# Patient Record
Sex: FEMALE | Race: WHITE | NOT HISPANIC OR LATINO | Employment: FULL TIME | URBAN - METROPOLITAN AREA
[De-identification: names, ages, dates, MRNs, and addresses within clinical notes are randomized per-mention and may not be internally consistent; named-entity substitution may affect disease eponyms.]

---

## 2017-09-28 ENCOUNTER — OFFICE VISIT (OUTPATIENT)
Dept: OBGYN | Facility: CLINIC | Age: 47
End: 2017-09-28
Payer: COMMERCIAL

## 2017-09-28 VITALS
WEIGHT: 118 LBS | BODY MASS INDEX: 18.52 KG/M2 | HEART RATE: 92 BPM | SYSTOLIC BLOOD PRESSURE: 92 MMHG | HEIGHT: 67 IN | DIASTOLIC BLOOD PRESSURE: 64 MMHG

## 2017-09-28 DIAGNOSIS — N89.8 VAGINAL IRRITATION: Primary | ICD-10-CM

## 2017-09-28 LAB
SPECIMEN SOURCE: NORMAL
WET PREP SPEC: NORMAL

## 2017-09-28 PROCEDURE — 99213 OFFICE O/P EST LOW 20 MIN: CPT | Performed by: OBSTETRICS & GYNECOLOGY

## 2017-09-28 PROCEDURE — 87210 SMEAR WET MOUNT SALINE/INK: CPT | Performed by: OBSTETRICS & GYNECOLOGY

## 2017-09-28 NOTE — PROGRESS NOTES
"OBGYN Note    SUBJECTIVE:  Rosie Lam is an 47 year old woman who presents with \"vaginal irritation.\" Symptoms   include local irritation \"anteriorly and posteriorly.\" Onset of symptoms 6   days ago, gradually improving since. States she had sex for an extended amount of time on Friday. She has had irritation since that time. She noticed her clitoral child to be red and enlarged over the weekend. Her and her partner used a new kind of lubrication. She does know the name or type. No malodor or change in vaginal discharge.     Predisposing factors: none  Hx of previous vaginitis: none  Sexually active: yes, single partner    Periods: two over the last year  Menopausal symptoms: has intermittent hot flashes    Past Medical History:   Diagnosis Date     History of colposcopy with cervical biopsy 8/23/10    Anaya 1     Papanicolaou smear of cervix with low grade squamous intraepithelial lesion (LGSIL) 3/29/10       Past Surgical History:   Procedure Laterality Date     US BREAST CYST ASPIRATION INITIAL LEFT  1-13       Family History   Problem Relation Age of Onset     Family History Negative Father      father unknown - no relationship     Family History Negative Mother      HEART DISEASE Maternal Grandmother      HEART DISEASE Maternal Aunt      HEART DISEASE Maternal Uncle        Social History   Substance Use Topics     Smoking status: Current Every Day Smoker     Packs/day: 1.00     Years: 15.00     Types: Cigarettes     Smokeless tobacco: Never Used     Alcohol use 0.0 oz/week     0 Standard drinks or equivalent per week      Comment: 1x/mo       Current Outpatient Prescriptions   Medication     Ibuprofen (ADVIL) 200 MG capsule     No current facility-administered medications for this visit.      Allergies   Allergen Reactions     No Known Drug Allergies      ROS:   Vagina: no abnormal discharge or odor  Vulva: no pruritis  Skin: no lesions or erythema except as noted in vulvar region    OBJECTIVE:  BP 92/64  " "Pulse 92  Ht 5' 6.5\" (1.689 m)  Wt 118 lb (53.5 kg)  LMP 09/14/2017 (Approximate)  Breastfeeding? No  BMI 18.76 kg/m2   General: alert, awake, normal body habitus, appears well  Pelvic: External genitalia and vagina normal. Bimanual and rectovaginal normal., negative findings:  external genitalia normal, no abnormal discharge, vaginal mucosa normal, cervix- no lesions, Bartholin's glands negative. Very small 3mm cyst noted in left labia.    Small hemorrhoidal tissue noted.    ASSESSMENT:  Vaginitis - likely from new lubrication product     PLAN:  1)  Discontinue use of lubrication product; recommend water based lubrication without additives/aromas or use of olive oil  2)  Recheck if symptoms persist, worsen, or new symptoms develop.  3) Wet prep collected and pending      Jillian Monae, DO    "

## 2017-09-28 NOTE — NURSING NOTE
"Chief Complaint   Patient presents with     Vaginal Problem     Vaginal irritation       Initial BP 92/64  Pulse 92  Ht 5' 6.5\" (1.689 m)  Wt 118 lb (53.5 kg)  LMP 2017 (Approximate)  Breastfeeding? No  BMI 18.76 kg/m2 Estimated body mass index is 18.76 kg/(m^2) as calculated from the following:    Height as of this encounter: 5' 6.5\" (1.689 m).    Weight as of this encounter: 118 lb (53.5 kg).  BP completed using cuff size: regular        The following HM Due: NONE      The following patient reported/Care Every where data was sent to:  P ABSTRACT QUALITY INITIATIVES [52936]  NA     patient has appointment for today    Clover BONILLA               "

## 2017-09-28 NOTE — MR AVS SNAPSHOT
"              After Visit Summary   2017    Rosie Lam    MRN: [id number]           Patient Information     Date Of Birth          1970        Visit Information        Provider Department      2017 8:30 AM Jillian Monae DO [facility]        Today's Diagnoses     Vaginal irritation    -  1       Follow-ups after your visit        Follow-up notes from your care team     Return if symptoms worsen or fail to improve.      Who to contact     If you have questions or need follow up information about today's clinic visit or your schedule please contact [facility] directly at [phone].  Normal or non-critical lab and imaging results will be communicated to you by [employer], letter or phone within 4 business days after the clinic has received the results. If you do not hear from us within 7 days, please contact the clinic through [employer] or phone. If you have a critical or abnormal lab result, we will notify you by phone as soon as possible.  Submit refill requests through [employer] or call your pharmacy and they will forward the refill request to us. Please allow 3 business days for your refill to be completed.          Additional Information About Your Visit        MyChart Information     [employer] lets you send messages to your doctor, view your test results, renew your prescriptions, schedule appointments and more. To sign up, go to [email or web address] . Click on \"Log in\" on the left side of the screen, which will take you to the Welcome page. Then click on \"Sign up Now\" on the right side of the page.     You will be asked to enter the access code listed below, as well as some personal information. Please follow the directions to create your username and password.     Your access code is: [email or web address]  Expires: 2017  9:39 AM     Your access code will  in 90 days. If you need help or a new code, please call your [facility] or " "[phone].        Care EveryWhere ID     This is your Care EveryWhere ID. This could be used by other organizations to access your [facility] medical records  [id number]        Your Vitals Were     Pulse Height Last Period Breastfeeding? BMI (Body Mass Index)       92 5' 6.5\" (1.689 m) 09/14/2017 (Approximate) No 18.76 kg/m2        Blood Pressure from Last 3 Encounters:   09/28/17 92/64   05/17/16 119/80   05/16/16 120/60    Weight from Last 3 Encounters:   09/28/17 118 lb (53.5 kg)   05/16/16 107 lb 11.2 oz (48.9 kg)   05/09/16 105 lb 9.6 oz (47.9 kg)              We Performed the Following     Wet prep          Today's Medication Changes          These changes are accurate as of: 9/28/17  9:39 AM.  If you have any questions, ask your nurse or doctor.               Stop taking these medicines if you haven't already. Please contact your care team if you have questions.     escitalopram 10 MG tablet   Commonly known as:  LEXAPRO   Stopped by:  Jillian Monae DO           MULTIVITAMIN & MINERAL PO   Stopped by:  Jillian Monae DO                    Primary Care Provider Office Phone # Fax #    Hammad Farrar [phone]       XXX RETIRED  [address] [facility] [address]        Equal Access to Services     RODNEY VICKERS AH: Hadii lissa ku hadasho Solanreali, waaxda luqadaha, qaybta kaalmada adeegyada, mabel ray . So [facility] [phone].    ATENCIÓN: Si habla español, tiene a calhoun disposición servicios gratuitos de asistencia lingüística. Llame al [phone].    We comply with applicable federal civil rights laws and [place] laws. We do not discriminate on the basis of race, color, national origin, age, disability sex, sexual orientation or gender identity.            Thank you!     Thank you for choosing [facility]  for your care. Our goal is always to provide you with excellent care. Hearing back from our patients is one way we " can continue to improve our services. Please take a few minutes to complete the written survey that you may receive in the mail after your visit with us. Thank you!             Your Updated Medication List - Protect others around you: Learn how to safely use, store and throw away your medicines at www.disposemymeds.org.          This list is accurate as of: 9/28/17  9:39 AM.  Always use your most recent med list.                   Brand Name Dispense Instructions for use Diagnosis    ADVIL 200 MG capsule   Generic drug:  ibuprofen      Take 200 mg by mouth every 4 hours as needed.

## 2017-10-03 ENCOUNTER — NURSE TRIAGE (OUTPATIENT)
Dept: NURSING | Facility: CLINIC | Age: 47
End: 2017-10-03

## 2017-10-03 NOTE — TELEPHONE ENCOUNTER
Rosie returning clinic call.  Noted per lab results, Rosie being contacted regarding results from 9/28/17.  Did already speak with a staff person, aware of results.  Phone number verified as correct.     Additional Information    [1] Follow-up call to recent contact AND [2] information only call, no triage required    Protocols used: [facility]

## 2018-05-22 ENCOUNTER — OFFICE VISIT (OUTPATIENT)
Dept: INTERNAL MEDICINE | Facility: CLINIC | Age: 48
End: 2018-05-22
Payer: COMMERCIAL

## 2018-05-22 VITALS
HEART RATE: 70 BPM | HEIGHT: 66 IN | DIASTOLIC BLOOD PRESSURE: 60 MMHG | WEIGHT: 121.4 LBS | TEMPERATURE: 98.4 F | SYSTOLIC BLOOD PRESSURE: 118 MMHG | RESPIRATION RATE: 20 BRPM | BODY MASS INDEX: 19.51 KG/M2

## 2018-05-22 DIAGNOSIS — Z11.4 SCREENING FOR HUMAN IMMUNODEFICIENCY VIRUS WITHOUT PRESENCE OF RISK FACTORS: ICD-10-CM

## 2018-05-22 DIAGNOSIS — Z76.89 ENCOUNTER TO ESTABLISH CARE: ICD-10-CM

## 2018-05-22 DIAGNOSIS — N95.1 MENOPAUSAL SYMPTOMS: Primary | ICD-10-CM

## 2018-05-22 DIAGNOSIS — Z13.220 SCREENING FOR CHOLESTEROL LEVEL: ICD-10-CM

## 2018-05-22 DIAGNOSIS — Z13.1 SCREENING FOR DIABETES MELLITUS: ICD-10-CM

## 2018-05-22 DIAGNOSIS — Z12.31 ENCOUNTER FOR SCREENING MAMMOGRAM FOR BREAST CANCER: ICD-10-CM

## 2018-05-22 DIAGNOSIS — Z71.6 ENCOUNTER FOR SMOKING CESSATION COUNSELING: ICD-10-CM

## 2018-05-22 DIAGNOSIS — R21 RASH: ICD-10-CM

## 2018-05-22 PROCEDURE — 99215 OFFICE O/P EST HI 40 MIN: CPT | Performed by: INTERNAL MEDICINE

## 2018-05-22 RX ORDER — VENLAFAXINE HYDROCHLORIDE 150 MG/1
150 CAPSULE, EXTENDED RELEASE ORAL DAILY
Qty: 90 CAPSULE | Refills: 0 | Status: SHIPPED | OUTPATIENT
Start: 2018-05-22 | End: 2018-08-23

## 2018-05-22 RX ORDER — CLOBETASOL PROPIONATE 0.5 MG/G
CREAM TOPICAL
Qty: 30 G | Refills: 0 | Status: SHIPPED | OUTPATIENT
Start: 2018-05-22 | End: 2020-07-02

## 2018-05-22 RX ORDER — MELATON/B.COHOSH/VALERIAN/HOPS 3 MG-40 MG
CAPSULE ORAL DAILY
COMMUNITY
Start: 2018-05-22 | End: 2021-07-30

## 2018-05-22 RX ORDER — VARENICLINE TARTRATE 1 MG/1
1 TABLET, FILM COATED ORAL 2 TIMES DAILY
Qty: 56 TABLET | Refills: 2 | Status: SHIPPED | OUTPATIENT
Start: 2018-05-22 | End: 2021-07-30

## 2018-05-22 ASSESSMENT — PAIN SCALES - GENERAL: PAINLEVEL: NO PAIN (0)

## 2018-05-22 NOTE — PROGRESS NOTES
SUBJECTIVE:                                                      HPI: Rosie Lam is a pleasant 47 year old female who presents with hot flashes, desire to quit smoking, and genital skin concern:    ---    Re: hot flashes:  - started 3 months ago  - periods have been irregular for a year or two; LMP ~2 months ago  - occur frequently throughout the day and night  - having difficulty sleeping due to hot flashes    Has been using black cohosh -no significant improvement in symptoms.    SH significant for current smoking.    ---    Re: desire to quit smoking:  - patient has tried quitting smoking before - cold turkey and with nicotine replacement -did not work  - has been smoking for 30 years, ~1 ppd  - patient would like to try Chantix  - discussed potential side effects of Chantix including vivid dreams and new or worsening mood symptoms    ---    Re: genital skin concern:  - has noticed an area of redness, itching, and irritation down there  - has been present for several months  - no new soaps or detergents    - no fevers or chills  - no abnormal vaginal discharge or vaginal symptoms  - no urinary symptoms    ---    Patient also like to establish care.    PMH, PSH, FH, SH, medications, allergies, immunizations, and preventative health measures reviewed.     Past Medical History:   Diagnosis Date     NO ACTIVE PROBLEMS      Past Surgical History:   Procedure Laterality Date     NO HISTORY OF SURGERY       Family History   Problem Relation Age of Onset     Type 2 Diabetes Mother      Hypertension Mother      Hyperlipidemia Mother      Skin Cancer Mother      non-melanoma     Myocardial Infarction Maternal Grandmother      later in life     Lung Cancer Maternal Grandmother      non-smoker     Coronary Artery Disease Maternal Aunt      x2 aunts; one with MI, one with PCIs - both in 50s     CEREBROVASCULAR DISEASE No family hx of      Breast Cancer No family hx of      Ovarian Cancer No family hx of      Colon  "Cancer No family hx of      Occupational History     [employer]      Social History Main Topics     Smoking status: Current Every Day Smoker     Packs/day: 1.00     Years: 30.00     Types: Cigarettes     Smokeless tobacco: Never Used     Alcohol use 0.0 oz/week     0 Standard drinks or equivalent per week      Comment: 1x/week     Drug use: No      Comment: remote history of sporadic cocaine uses     Sexual activity: Yes     Partners: Male     Social History Narrative    Dating/in long-term relationship.    1 adult son (in [place])    1 grandchild.    Yoga on and off, exercises on and off, walks in summer.      Allergies   Allergen Reactions     No Known Drug Allergies      Current Outpatient Prescriptions   Medication Sig     Black Cohosh-SoyIsoflav-Magnol (ESTROVEN MENOPAUSE RELIEF) CAPS Take by mouth daily     Immunization History   Administered Date(s) Administered     TDAP Vaccine (Adacel) 08/30/2010     OBJECTIVE:                                                      /60 (BP Location: Left arm, Patient Position: Chair, Cuff Size: Adult Regular)  Pulse 70  Temp 98.4  F (36.9  C) (Oral)  Resp 20  Ht 5' 6\" (1.676 m)  Wt 121 lb 6.4 oz (55.1 kg)  BMI 19.59 kg/m2  Constitutional: well-appearing  Psych: normal judgment and insight; normal mood and affect; recent and remote memory intact; oriented to time, place, and person  Integumentary: well-circumscribed mildly erythematous patch, with fine overlying scale, ~1.5 cm in size, central, anterior perineum    PREVENTATIVE HEALTH                                                      BMI: within normal limits   Blood pressure: within normal limits   Breast CA screening: DUE  Cervical CA screening: not medically indicated at this time   Colon CA screening: not medically indicated at this time   Lung CA screening: not medically indicated at this time   Dexa: not medically indicated at this time   Screening HCV: n/a   Screening HIV: DUE  Screening " cholesterol: DUE  Screening diabetes: DUE  STD testing: no risk factors present  Depression screening: PHQ-2 assessment completed and reviewed - no intervention indicated at this time  Alcohol misuse screening: alcohol use reviewed - no intervention indicated at this time  Immunizations: reviewed; up to date     ASSESSMENT/PLAN:                                                       (N95.1) Menopausal symptoms  (primary encounter diagnosis)  Comment: patient is not a candidate for OCP or HRT due to smoking status.  Plan:    - TRIAL of venlafaxine  mg daily.   - if no or insufficient improvement or adverse side effects develop, patient to contact MD.    (Z71.6,  Z72.0) Encounter for smoking cessation counseling  Plan:    - START Chantix (starter and continuing packs prescribed) 1 week prior to smoking cessation.   - if adverse side effects develop, patient to stop and contact MD.    (R21) Rash  Comment:    - central, anterior perineum, along the vulvar edge.    - suspect psoriasis, but lichen sclerosis, dermatitis, and fungal infection are also on the differential.  Plan:    - TRIAL of clobetasol 0.05% cream twice a day for no more than 2 weeks.   - if symptoms worsen, change, or do not improve, patient to contact MD.    (Z76.89) Encounter to establish care  Comment: PMH, PSH, FH, SH, medications, allergies, immunizations, and preventative health measures reviewed.   Plan: see below for plans.    (Z13.1) Screening for diabetes mellitus  (Z13.220) Screening for cholesterol level  (Z11.4) Screening for human immunodeficiency virus without presence of risk factors  Plan: patient will return for fasting labs and able.    (Z12.31) Encounter for screening mammogram for breast cancer  Plan: mammogram ordered - patient to schedule.    The instructions on the AVS were discussed and explained to the patient. Patient expressed understanding of instructions.    A total of 42 minutes were spent face-to-face with this patient  during this encounter and over half of that time was spent on counseling and coordination of care re: above diagnoses and plans of care.     (Chart documentation was completed, in part, with Rad voice-recognition software. Even though reviewed, some grammatical, spelling, and word errors may remain.)    Brandy Shah MD   [address] [address]  T: [phone], F: [phone]

## 2018-05-22 NOTE — PATIENT INSTRUCTIONS
"Please schedule mammogram and fasting labs on the way out today.     ---    For smoking cessation:    Chantix - \"starter\" pack - start first; followed by \"continuing\" pack - has refills.     Start Chantix 1 week prior to smoking cessation.    ---    For hot flashes:    Start venlafaxine 150mg daily.    Can increase as needed.     ---    For rash down there:    TRIAL of clobetasol cream twice a day for two weeks.    If no improvement in 2 weeks or rash worsens, please let me know.       "

## 2018-05-22 NOTE — MR AVS SNAPSHOT
"              After Visit Summary   5/22/2018    Rosie Lam    MRN: [id number]           Patient Information     Date Of Birth          1970        Visit Information        Provider Department      5/22/2018 10:30 AM Brandy Shah MD [facility]        Today's Diagnoses     Encounter for smoking cessation counseling    -  1    Menopausal symptoms        Screening for diabetes mellitus        Screening for cholesterol level        Screening for human immunodeficiency virus without presence of risk factors        Encounter for screening mammogram for breast cancer        Rash          Care Instructions    Please schedule mammogram and fasting labs on the way out today.     ---    For smoking cessation:    Chantix - \"starter\" pack - start first; followed by \"continuing\" pack - has refills.     Start Chantix 1 week prior to smoking cessation.    ---    For hot flashes:    Start venlafaxine 150mg daily.    Can increase as needed.     ---    For rash down there:    TRIAL of clobetasol cream twice a day for two weeks.    If no improvement in 2 weeks or rash worsens, please let me know.               Follow-ups after your visit        Future tests that were ordered for you today     Open Future Orders        Priority Expected Expires Ordered    MA Screening Digital Bilateral Routine  5/23/2019 5/22/2018    Lipid Profile Routine  5/22/2019 5/22/2018    Comprehensive metabolic panel Routine  5/22/2019 5/22/2018    HIV Antigen Antibody Combo Routine  5/22/2019 5/22/2018            Who to contact     If you have questions or need follow up information about today's clinic visit or your schedule please contact [facility] directly at [phone].  Normal or non-critical lab and imaging results will be communicated to you by MyChart, letter or phone within 4 business days after the clinic has received the results. If you do not hear from us within 7 days, please " "contact the clinic through [employer] or phone. If you have a critical or abnormal lab result, we will notify you by phone as soon as possible.  Submit refill requests through [employer] or call your pharmacy and they will forward the refill request to us. Please allow 3 business days for your refill to be completed.          Additional Information About Your Visit        [employer] Information     [employer] lets you send messages to your doctor, view your test results, renew your prescriptions, schedule appointments and more. To sign up, go to [email or web address] . Click on \"Log in\" on the left side of the screen, which will take you to the Welcome page. Then click on \"Sign up Now\" on the right side of the page.     You will be asked to enter the access code listed below, as well as some personal information. Please follow the directions to create your username and password.     Your access code is: [email or web address]  Expires: 2018 11:02 AM     Your access code will  in 90 days. If you need help or a new code, please call your [facility] clinic or [phone].        Care EveryWhere ID     This is your Care EveryWhere ID. This could be used by other organizations to access your [facility] medical records  [id number]        Your Vitals Were     Pulse Temperature Respirations Height BMI (Body Mass Index)       70 98.4  F (36.9  C) (Oral) 20 5' 6\" (1.676 m) 19.59 kg/m2        Blood Pressure from Last 3 Encounters:   18 118/60   17 92/64   16 119/80    Weight from Last 3 Encounters:   18 121 lb 6.4 oz (55.1 kg)   17 118 lb (53.5 kg)   16 107 lb 11.2 oz (48.9 kg)                 Today's Medication Changes          These changes are accurate as of 18 11:02 AM.  If you have any questions, ask your nurse or doctor.               Start taking these medicines.        Dose/Directions    clobetasol 0.05 % cream   Commonly known as:  TEMOVATE   Used for:  Rash   Started by:  Alyssa, " Brandy SINHA MD        Apply sparingly to affected area twice daily for 14 days.  Do not apply to face.   Quantity:  30 g   Refills:  0       * varenicline 0.5 MG X 11 & 1 MG X 42 tablet   Commonly known as:  CHANTIX STARTING MONTH PAK   Used for:  Encounter for smoking cessation counseling   Started by:  Brandy Shah MD        Take 0.5 mg tab daily for 3 days, then 0.5 mg tab twice daily for 4 days, then 1 mg twice daily.   Quantity:  53 tablet   Refills:  0       * varenicline 1 MG tablet   Commonly known as:  CHANTIX   Used for:  Encounter for smoking cessation counseling   Started by:  Brandy Shah MD        Dose:  1 mg   Take 1 tablet (1 mg) by mouth 2 times daily   Quantity:  56 tablet   Refills:  2       venlafaxine 150 MG 24 hr capsule   Commonly known as:  EFFEXOR-XR   Used for:  Menopausal symptoms   Started by:  Brandy Shah MD        Dose:  150 mg   Take 1 capsule (150 mg) by mouth daily   Quantity:  90 capsule   Refills:  0       * Notice:  This list has 2 medication(s) that are the same as other medications prescribed for you. Read the directions carefully, and ask your doctor or other care provider to review them with you.         Where to get your medicines      These medications were sent to [facility] [id number] [place] [address] [address] AT [facility] [address]  [facility] [address] [facility] [address]     Phone:  [phone]     clobetasol 0.05 % cream    varenicline 0.5 MG X 11 & 1 MG X 42 tablet    varenicline 1 MG tablet    venlafaxine 150 MG 24 hr capsule                Primary Care Provider Office Phone # Fax #    Brandy Shah [phone]       [address] [facility] [address]        Equal Access to Services     RODNEY VICKERS AH: Mateo Castaneda, waaxda luqadaha, qaybta kaalmada grzegorzyaerin, mabel boudreaux. So [facility] [phone].    ATENCIÓN: Si habla español, tiene a calhoun disposición servicios  faby de asistencia lingüística. Lexii boyer [phone].    We comply with applicable federal civil rights laws and [place] laws. We do not discriminate on the basis of race, color, national origin, age, disability, sex, sexual orientation, or gender identity.            Thank you!     Thank you for choosing [facility]  for your care. Our goal is always to provide you with excellent care. Hearing back from our patients is one way we can continue to improve our services. Please take a few minutes to complete the written survey that you may receive in the mail after your visit with us. Thank you!             Your Updated Medication List - Protect others around you: Learn how to safely use, store and throw away your medicines at www.disposemymeds.org.          This list is accurate as of 5/22/18 11:02 AM.  Always use your most recent med list.                   Brand Name Dispense Instructions for use Diagnosis    ADVIL 200 MG capsule   Generic drug:  ibuprofen      Take 200 mg by mouth every 4 hours as needed.        clobetasol 0.05 % cream    TEMOVATE    30 g    Apply sparingly to affected area twice daily for 14 days.  Do not apply to face.    Rash       ESTROVEN MENOPAUSE RELIEF Caps      Take by mouth daily        * varenicline 0.5 MG X 11 & 1 MG X 42 tablet    CHANTIX STARTING MONTH AJAY    53 tablet    Take 0.5 mg tab daily for 3 days, then 0.5 mg tab twice daily for 4 days, then 1 mg twice daily.    Encounter for smoking cessation counseling       * varenicline 1 MG tablet    CHANTIX    56 tablet    Take 1 tablet (1 mg) by mouth 2 times daily    Encounter for smoking cessation counseling       venlafaxine 150 MG 24 hr capsule    EFFEXOR-XR    90 capsule    Take 1 capsule (150 mg) by mouth daily    Menopausal symptoms       * Notice:  This list has 2 medication(s) that are the same as other medications prescribed for you. Read the directions carefully, and ask your doctor or other  care provider to review them with you.

## 2018-05-23 ASSESSMENT — PATIENT HEALTH QUESTIONNAIRE - PHQ9: SUM OF ALL RESPONSES TO PHQ QUESTIONS 1-9: 5

## 2018-06-04 ENCOUNTER — RADIANT APPOINTMENT (OUTPATIENT)
Dept: MAMMOGRAPHY | Facility: CLINIC | Age: 48
End: 2018-06-04
Attending: INTERNAL MEDICINE
Payer: COMMERCIAL

## 2018-06-04 DIAGNOSIS — Z12.31 ENCOUNTER FOR SCREENING MAMMOGRAM FOR BREAST CANCER: ICD-10-CM

## 2018-06-04 DIAGNOSIS — Z12.31 VISIT FOR SCREENING MAMMOGRAM: ICD-10-CM

## 2018-06-04 DIAGNOSIS — Z11.4 SCREENING FOR HUMAN IMMUNODEFICIENCY VIRUS WITHOUT PRESENCE OF RISK FACTORS: ICD-10-CM

## 2018-06-04 DIAGNOSIS — Z13.220 SCREENING FOR CHOLESTEROL LEVEL: ICD-10-CM

## 2018-06-04 DIAGNOSIS — Z13.1 SCREENING FOR DIABETES MELLITUS: ICD-10-CM

## 2018-06-04 LAB
ALBUMIN SERPL-MCNC: 4.2 G/DL (ref 3.4–5)
ALP SERPL-CCNC: 75 U/L (ref 40–150)
ALT SERPL W P-5'-P-CCNC: 19 U/L (ref 0–50)
ANION GAP SERPL CALCULATED.3IONS-SCNC: 4 MMOL/L (ref 3–14)
AST SERPL W P-5'-P-CCNC: 15 U/L (ref 0–45)
BILIRUB SERPL-MCNC: 0.4 MG/DL (ref 0.2–1.3)
BUN SERPL-MCNC: 13 MG/DL (ref 7–30)
CALCIUM SERPL-MCNC: 9.4 MG/DL (ref 8.5–10.1)
CHLORIDE SERPL-SCNC: 107 MMOL/L (ref 94–109)
CHOLEST SERPL-MCNC: 228 MG/DL
CO2 SERPL-SCNC: 30 MMOL/L (ref 20–32)
CREAT SERPL-MCNC: 1.11 MG/DL (ref 0.52–1.04)
GFR SERPL CREATININE-BSD FRML MDRD: 52 ML/MIN/1.7M2
GLUCOSE SERPL-MCNC: 108 MG/DL (ref 70–99)
HDLC SERPL-MCNC: 72 MG/DL
HIV 1+2 AB+HIV1 P24 AG SERPL QL IA: NONREACTIVE
LDLC SERPL CALC-MCNC: 136 MG/DL
NONHDLC SERPL-MCNC: 156 MG/DL
POTASSIUM SERPL-SCNC: 3.9 MMOL/L (ref 3.4–5.3)
PROT SERPL-MCNC: 7.7 G/DL (ref 6.8–8.8)
SODIUM SERPL-SCNC: 141 MMOL/L (ref 133–144)
TRIGL SERPL-MCNC: 98 MG/DL

## 2018-06-04 PROCEDURE — 36415 COLL VENOUS BLD VENIPUNCTURE: CPT | Performed by: INTERNAL MEDICINE

## 2018-06-04 PROCEDURE — 87389 HIV-1 AG W/HIV-1&-2 AB AG IA: CPT | Performed by: INTERNAL MEDICINE

## 2018-06-04 PROCEDURE — 77067 SCR MAMMO BI INCL CAD: CPT | Mod: TC

## 2018-06-04 PROCEDURE — 80061 LIPID PANEL: CPT | Performed by: INTERNAL MEDICINE

## 2018-06-04 PROCEDURE — 80053 COMPREHEN METABOLIC PANEL: CPT | Performed by: INTERNAL MEDICINE

## 2018-08-23 DIAGNOSIS — N95.1 MENOPAUSAL SYMPTOMS: ICD-10-CM

## 2018-08-23 RX ORDER — VENLAFAXINE HYDROCHLORIDE 150 MG/1
CAPSULE, EXTENDED RELEASE ORAL
Qty: 90 CAPSULE | Refills: 0 | Status: SHIPPED | OUTPATIENT
Start: 2018-08-23 | End: 2019-01-29

## 2018-08-23 NOTE — TELEPHONE ENCOUNTER
"Requested Prescriptions   Pending Prescriptions Disp Refills     venlafaxine (EFFEXOR-XR) 150 MG 24 hr capsule [Pharmacy Med Name: VENLAFAXINE ER 150MG CAPSULES]  Last Written Prescription Date:  05/22/2018  Last Fill Quantity: 90,  # refills: 0   Last Office Visit: 5/22/2018   Future Office Visit:      90 capsule 0     Sig: TAKE 1 CAPSULE(150 MG) BY MOUTH DAILY    Serotonin-Norepinephrine Reuptake Inhibitors  Failed    8/23/2018 10:22 AM       Failed - Normal serum creatinine on file in past 12 months    Recent Labs   Lab Test  06/04/18   0826   CR  1.11*            Passed - Blood pressure under 140/90 in past 12 months    BP Readings from Last 3 Encounters:   05/22/18 118/60   09/28/17 92/64   05/17/16 119/80                Passed - Recent (12 mo) or future (30 days) visit within the authorizing provider's specialty    Patient had office visit in the last 12 months or has a visit in the next 30 days with authorizing provider or within the authorizing provider's specialty.  See \"Patient Info\" tab in inbasket, or \"Choose Columns\" in Meds & Orders section of the refill encounter.           Passed - Patient is age 18 or older       Passed - No active pregnancy on record       Passed - No positive pregnancy test in past 12 months          "

## 2019-01-29 ENCOUNTER — TELEPHONE (OUTPATIENT)
Dept: INTERNAL MEDICINE | Facility: CLINIC | Age: 49
End: 2019-01-29

## 2019-01-29 DIAGNOSIS — N95.1 MENOPAUSAL SYNDROME (HOT FLASHES): Primary | ICD-10-CM

## 2019-01-29 RX ORDER — VENLAFAXINE HYDROCHLORIDE 75 MG/1
75 CAPSULE, EXTENDED RELEASE ORAL DAILY
Qty: 90 CAPSULE | Refills: 1 | Status: SHIPPED | OUTPATIENT
Start: 2019-01-29 | End: 2021-07-30

## 2019-01-29 RX ORDER — VENLAFAXINE HYDROCHLORIDE 150 MG/1
150 CAPSULE, EXTENDED RELEASE ORAL DAILY
Qty: 90 CAPSULE | Refills: 1 | Status: SHIPPED | OUTPATIENT
Start: 2019-01-29 | End: 2020-01-29

## 2019-01-29 NOTE — TELEPHONE ENCOUNTER
Reason for Call:  Other call back    Detailed comments: Pt is requesting a higher dose of venlafaxine.  Please call pt to discuss. Pt declined making an appt.  Phone Number Patient can be reached at: Home number on file [phone] (home)    Best Time: after 2pm    Can we leave a detailed message on this number? YES    Call taken on 1/29/2019 at 9:13 AM by YOGI ONEILL

## 2020-06-22 ENCOUNTER — TELEPHONE (OUTPATIENT)
Dept: INTERNAL MEDICINE | Facility: CLINIC | Age: 50
End: 2020-06-22

## 2020-07-01 ENCOUNTER — OFFICE VISIT (OUTPATIENT)
Dept: INTERNAL MEDICINE | Facility: CLINIC | Age: 50
End: 2020-07-01
Payer: COMMERCIAL

## 2020-07-01 VITALS
TEMPERATURE: 98.7 F | HEART RATE: 58 BPM | SYSTOLIC BLOOD PRESSURE: 100 MMHG | DIASTOLIC BLOOD PRESSURE: 70 MMHG | RESPIRATION RATE: 16 BRPM | BODY MASS INDEX: 20.76 KG/M2 | OXYGEN SATURATION: 99 % | WEIGHT: 128.6 LBS

## 2020-07-01 DIAGNOSIS — Z71.1 CONCERN ABOUT SKIN DISEASE WITHOUT DIAGNOSIS: Primary | ICD-10-CM

## 2020-07-01 DIAGNOSIS — M77.11 LATERAL EPICONDYLITIS OF RIGHT ELBOW: ICD-10-CM

## 2020-07-01 DIAGNOSIS — M25.571 CHRONIC PAIN OF RIGHT ANKLE: ICD-10-CM

## 2020-07-01 DIAGNOSIS — G89.29 CHRONIC PAIN OF RIGHT ANKLE: ICD-10-CM

## 2020-07-01 PROCEDURE — 99214 OFFICE O/P EST MOD 30 MIN: CPT | Performed by: INTERNAL MEDICINE

## 2020-07-01 RX ORDER — CLOBETASOL PROPIONATE 0.5 MG/G
OINTMENT TOPICAL 2 TIMES DAILY
Qty: 15 G | Refills: 1 | Status: SHIPPED | OUTPATIENT
Start: 2020-07-01 | End: 2020-07-02

## 2020-07-01 NOTE — PROGRESS NOTES
SUBJECTIVE:                                                      HPI: Rosie Lam is a pleasant 50 year old female who presents with a skin concern:    Involves perineum, midline. Chronic in nature - has been present for years. Stable in size. Generally asymptomatic, but becomes inflamed after intercourse due to friction.  Has used topical corticosteroids in the past - does not remember if they helped with inflammation.    Patient also complains of chronic, mild right ankle pain. Patient works as a  so is on her feet for multiple hours at a time.  Her ankle pain is worse at the end of his shift and has been better lately due to her reduced workload (due to COVID-19).    Finally, patient complains of right upper forearm pain when grabbing and lifting items. She is right-handed and, again, works as a  - grabbing and lifting items for multiple hours at a time.    The medication, allergy, and problem lists have been reviewed and updated as appropriate.     OBJECTIVE:                                                      /70   Pulse 58   Temp 98.7  F (37.1  C) (Oral)   Resp 16   Wt 58.3 kg (128 lb 9.6 oz)   SpO2 99%   BMI 20.76 kg/m    Constitutional: well-appearing  Musculoskeletal: normal gait and station; no right ankle deformity, redness, or swelling; no right forearm redness or swelling; tenderness to palpation along right sided common extensor tendon  Genitourinary: external genitalia, urethral meatus, and vagina normal; raised line of skin-colored skin midline perineum with some overlying scale  Psych: normal judgment and insight; normal mood and affect; recent and remote memory intact    ASSESSMENT/PLAN:                                                      (Z71.1) Concern about skin disease without diagnosis  (primary encounter diagnosis)  Comment: suspect skin concern represents a perineal raphe (benign).  Plan: no specific intervention recommended, but patient may use topical  corticosteroid as needed for inflammation.    (M25.571,  G89.29) Chronic pain of right ankle  Comment: likely mild osteoarthritis.  Plan: patient encouraged to wear supportive footwear and avoid significantly prolonged standing and walking; rest, elevate, compression, cool compresses, and NSAIDs as needed for pain relief.    (M77.11) Lateral epicondylitis of right elbow  Plan: recommend trial of counterforce brace.    The instructions on the AVS were discussed and explained to the patient. Patient expressed understanding of instructions.    (Chart documentation was completed, in part, with [employer] voice-recognition software. Even though reviewed, some grammatical, spelling, and word errors may remain.)    Brandy Shah MD   [address] [address]  T: [phone], F: [phone]

## 2020-07-02 ENCOUNTER — TELEPHONE (OUTPATIENT)
Dept: INTERNAL MEDICINE | Facility: CLINIC | Age: 50
End: 2020-07-02

## 2020-07-02 DIAGNOSIS — Z71.1 CONCERN ABOUT SKIN DISEASE WITHOUT DIAGNOSIS: Primary | ICD-10-CM

## 2020-07-02 RX ORDER — CLOBETASOL PROPIONATE 0.5 MG/G
OINTMENT TOPICAL 2 TIMES DAILY
Status: CANCELLED | OUTPATIENT
Start: 2020-07-02

## 2020-07-02 RX ORDER — BETAMETHASONE DIPROPIONATE 0.5 MG/G
CREAM TOPICAL 2 TIMES DAILY PRN
Qty: 15 G | Refills: 1 | Status: SHIPPED | OUTPATIENT
Start: 2020-07-02 | End: 2021-07-30

## 2020-07-02 RX ORDER — BETAMETHASONE DIPROPIONATE 0.5 MG/G
CREAM TOPICAL 2 TIMES DAILY
Status: CANCELLED | OUTPATIENT
Start: 2020-07-02

## 2020-07-02 NOTE — TELEPHONE ENCOUNTER
Jose faxed over a request for an alternative for clobetasol (TEMOVATE) 0.05 % external ointment. Drug not covered by plan. Preferred alternative is Clobetasol emollient or Betamethasonedipropa.  Jose phone [phone]

## 2021-04-04 ENCOUNTER — HEALTH MAINTENANCE LETTER (OUTPATIENT)
Age: 51
End: 2021-04-04

## 2021-07-30 ENCOUNTER — OFFICE VISIT (OUTPATIENT)
Dept: INTERNAL MEDICINE | Facility: CLINIC | Age: 51
End: 2021-07-30
Payer: COMMERCIAL

## 2021-07-30 VITALS
HEART RATE: 54 BPM | HEIGHT: 66 IN | SYSTOLIC BLOOD PRESSURE: 112 MMHG | TEMPERATURE: 97.4 F | BODY MASS INDEX: 19.77 KG/M2 | OXYGEN SATURATION: 99 % | WEIGHT: 123 LBS | RESPIRATION RATE: 16 BRPM | DIASTOLIC BLOOD PRESSURE: 68 MMHG

## 2021-07-30 DIAGNOSIS — Z12.4 SCREENING FOR CERVICAL CANCER: ICD-10-CM

## 2021-07-30 DIAGNOSIS — Z12.11 SPECIAL SCREENING FOR MALIGNANT NEOPLASMS, COLON: ICD-10-CM

## 2021-07-30 DIAGNOSIS — Z23 NEED FOR VACCINATION: ICD-10-CM

## 2021-07-30 DIAGNOSIS — Z13.1 SCREENING FOR DIABETES MELLITUS: ICD-10-CM

## 2021-07-30 DIAGNOSIS — Z13.220 SCREENING FOR CHOLESTEROL LEVEL: ICD-10-CM

## 2021-07-30 DIAGNOSIS — Z12.31 ENCOUNTER FOR SCREENING MAMMOGRAM FOR BREAST CANCER: ICD-10-CM

## 2021-07-30 DIAGNOSIS — Z00.00 ROUTINE HISTORY AND PHYSICAL EXAMINATION OF ADULT: Primary | ICD-10-CM

## 2021-07-30 LAB
ALBUMIN SERPL-MCNC: 3.8 G/DL (ref 3.4–5)
ALP SERPL-CCNC: 82 U/L (ref 40–150)
ALT SERPL W P-5'-P-CCNC: 20 U/L (ref 0–50)
ANION GAP SERPL CALCULATED.3IONS-SCNC: <1 MMOL/L (ref 3–14)
AST SERPL W P-5'-P-CCNC: 18 U/L (ref 0–45)
BILIRUB SERPL-MCNC: 0.3 MG/DL (ref 0.2–1.3)
BUN SERPL-MCNC: 14 MG/DL (ref 7–30)
CALCIUM SERPL-MCNC: 9.4 MG/DL (ref 8.5–10.1)
CHLORIDE BLD-SCNC: 112 MMOL/L (ref 94–109)
CHOLEST SERPL-MCNC: 234 MG/DL
CO2 SERPL-SCNC: 28 MMOL/L (ref 20–32)
CREAT SERPL-MCNC: 1.23 MG/DL (ref 0.52–1.04)
FASTING STATUS PATIENT QL REPORTED: NO
GFR SERPL CREATININE-BSD FRML MDRD: 51 ML/MIN/1.73M2
GLUCOSE BLD-MCNC: 95 MG/DL (ref 70–99)
HDLC SERPL-MCNC: 64 MG/DL
LDLC SERPL CALC-MCNC: 133 MG/DL
NONHDLC SERPL-MCNC: 170 MG/DL
POTASSIUM BLD-SCNC: 4.3 MMOL/L (ref 3.4–5.3)
PROT SERPL-MCNC: 7 G/DL (ref 6.8–8.8)
SODIUM SERPL-SCNC: 139 MMOL/L (ref 133–144)
TRIGL SERPL-MCNC: 183 MG/DL

## 2021-07-30 PROCEDURE — 87624 HPV HI-RISK TYP POOLED RSLT: CPT | Performed by: INTERNAL MEDICINE

## 2021-07-30 PROCEDURE — 90471 IMMUNIZATION ADMIN: CPT | Performed by: INTERNAL MEDICINE

## 2021-07-30 PROCEDURE — 90750 HZV VACC RECOMBINANT IM: CPT | Performed by: INTERNAL MEDICINE

## 2021-07-30 PROCEDURE — 90714 TD VACC NO PRESV 7 YRS+ IM: CPT | Performed by: INTERNAL MEDICINE

## 2021-07-30 PROCEDURE — 99396 PREV VISIT EST AGE 40-64: CPT | Mod: 25 | Performed by: INTERNAL MEDICINE

## 2021-07-30 PROCEDURE — G0145 SCR C/V CYTO,THINLAYER,RESCR: HCPCS | Performed by: INTERNAL MEDICINE

## 2021-07-30 PROCEDURE — 90472 IMMUNIZATION ADMIN EACH ADD: CPT | Performed by: INTERNAL MEDICINE

## 2021-07-30 PROCEDURE — 80061 LIPID PANEL: CPT | Performed by: INTERNAL MEDICINE

## 2021-07-30 PROCEDURE — 36415 COLL VENOUS BLD VENIPUNCTURE: CPT | Performed by: INTERNAL MEDICINE

## 2021-07-30 PROCEDURE — 80053 COMPREHEN METABOLIC PANEL: CPT | Performed by: INTERNAL MEDICINE

## 2021-07-30 PROCEDURE — 82274 ASSAY TEST FOR BLOOD FECAL: CPT | Performed by: INTERNAL MEDICINE

## 2021-07-30 ASSESSMENT — MIFFLIN-ST. JEOR: SCORE: 1184.67

## 2021-07-30 NOTE — PROGRESS NOTES
ASSESSMENT/PLAN                                                       (Z00.00) Routine history and physical examination of adult  (primary encounter diagnosis)  Comment: PMH, PSH, FH, SH, medications, allergies, immunizations, and preventative health measures reviewed and updated as appropriate.  Plan: see below for plans.      (Z12.31) Encounter for screening mammogram for breast cancer  Plan: screening mammogram ordered - patient to schedule.     (Z12.11) Special screening for malignant neoplasms, colon  Plan: FIT test ordered - patient to pick-up, complete, and mail in when able.     (Z12.4) Screening for cervical cancer  Plan: pap smear obtained today.    (Z13.220) Screening for cholesterol level  (Z13.1) Screening for diabetes mellitus  Plan: fasting labs today.    (Z23) Need for vaccination  Plan: TD given today; Shingrix #1 given today; #2 in 2-6 months.    Brandy Shah MD   [address] [address]  T: [phone], F: [phone]    SUBJECTIVE                                                      Rosie Lam is a very pleasant 51 year old female who presents for a physical.    ROS:  Constitutional: no unintentional weight loss or gain reported; no fevers, chills, or sweats reported  Cardiovascular: no chest pain, palpitations, or edema reported  Respiratory: no cough, wheezing, shortness of breath, or dyspnea on exertion reported  Gastrointestinal: no nausea, vomiting, constipation, diarrhea, or abdominal pain reported  Genitourinary: no urinary frequency, urgency, dysuria, or hematuria reported  Integumentary: no rash or pruritus reported  Musculoskeletal: no back pain, muscle pain, joint pain, or joint swelling reported  Neurologic: no focal weakness, numbness, or tingling reported  Hematologic: no easy bruising or bleeding reported  Endocrine: no heat or cold intolerance reported; no polyuria or polydipsia reported  Psychiatric: no anxiety or depression  reported    Past Medical History:   Diagnosis Date     NO ACTIVE PROBLEMS      Past Surgical History:   Procedure Laterality Date     NO HISTORY OF SURGERY       Family History   Problem Relation Age of Onset     Diabetes Type 2  Mother      Hypertension Mother      Hyperlipidemia Mother      Skin Cancer Mother         non-melanoma     Myocardial Infarction Maternal Grandmother         later in life     Lung Cancer Maternal Grandmother         non-smoker     Coronary Artery Disease Maternal Aunt         x2 aunts; one with MI, one with PCIs - both in 50s     Cerebrovascular Disease No family hx of      Breast Cancer No family hx of      Ovarian Cancer No family hx of      Colon Cancer No family hx of      Social History     Occupational History     Occupation: [employer]   Tobacco Use     Smoking status: Current Every Day Smoker     Packs/day: 1.00     Years: 31.00     Pack years: 31.00     Types: Cigarettes     Smokeless tobacco: Never Used     Tobacco comment: 0.75ppd (as of 2021); 31 years (as of 2021)   Substance and Sexual Activity     Alcohol use: Yes     Comment: rare     Drug use: No     Comment: remote history of sporadic cocaine uses     Sexual activity:  Not currently   Social History Narrative    Single.    1 adult son (in [place])    2 grandchildren.    Yoga on and off, exercises on and off, walks in summer.      No Known Allergies     Immunization History   Administered Date(s) Administered     COVID-19,PF,Latrell 04/08/2021     TDAP Vaccine (Adacel) 08/30/2010     PREVENTATIVE HEALTH                                                      BMI: within normal limits   Blood pressure: within normal limits   Breast CA screening: DUE  Cervical CA screening: DUE  Colon CA screening: DUE  Lung CA screening: patient does not meet screening criteria  Dexa: not medically indicated at this time   Screening cholesterol: DUE  Screening diabetes: DUE  STD testing: not sexually active  Alcohol misuse screening:  "alcohol use reviewed - no intervention indicated at this time  Immunizations: reviewed; Shingrix series and TD DUE    OBJECTIVE                                                      /68 (BP Location: Left arm, Patient Position: Chair, Cuff Size: Adult Regular)   Pulse 54   Temp 97.4  F (36.3  C) (Temporal)   Resp 16   Ht 1.676 m (5' 6\")   Wt 55.8 kg (123 lb)   SpO2 99%   BMI 19.85 kg/m    Constitutional: well-appearing  Head, Ears, and Eyes: normocephalic; normal external auditory canal and pinna; tympanic membranes visualized and normal; normal lids and conjunctivae  Neck: supple, symmetric, no thyromegaly or lymphadenopathy  Respiratory: normal respiratory effort; clear to auscultation bilaterally  Cardiovascular: regular rate and rhythm; no edema  Gastrointestinal: soft, non-tender, and non-distended; no organomegaly or masses  Genitourinary: external genitalia, urethral meatus, and vagina normal; cervix visualized and normal in appearance  Musculoskeletal: normal gait and station  Psych: normal judgment and insight; normal mood and affect; recent and remote memory intact  ---  (Note was completed, in part, with [employer] voice-recognition software. Documentation was reviewed, but some grammatical, spelling, and word errors may remain.)    "

## 2021-08-03 LAB
BKR LAB AP GYN ADEQUACY: NORMAL
BKR LAB AP GYN INTERPRETATION: NORMAL
BKR LAB AP HPV REFLEX: NORMAL
BKR LAB AP PREVIOUS ABNORMAL: NORMAL
PATH REPORT.COMMENTS IMP SPEC: NORMAL
PATH REPORT.RELEVANT HX SPEC: NORMAL

## 2021-08-04 LAB — HEMOCCULT STL QL IA: NEGATIVE

## 2021-08-05 LAB
HUMAN PAPILLOMA VIRUS 16 DNA: NEGATIVE
HUMAN PAPILLOMA VIRUS 18 DNA: NEGATIVE
HUMAN PAPILLOMA VIRUS FINAL DIAGNOSIS: NORMAL
HUMAN PAPILLOMA VIRUS OTHER HR: NEGATIVE

## 2021-08-09 PROBLEM — N87.0 DYSPLASIA OF CERVIX, LOW GRADE (CIN 1): Status: ACTIVE | Noted: 2021-08-09

## 2021-09-18 ENCOUNTER — HEALTH MAINTENANCE LETTER (OUTPATIENT)
Age: 51
End: 2021-09-18

## 2022-04-30 ENCOUNTER — HEALTH MAINTENANCE LETTER (OUTPATIENT)
Age: 52
End: 2022-04-30

## 2022-11-20 ENCOUNTER — HEALTH MAINTENANCE LETTER (OUTPATIENT)
Age: 52
End: 2022-11-20

## 2023-06-01 ENCOUNTER — HEALTH MAINTENANCE LETTER (OUTPATIENT)
Age: 53
End: 2023-06-01

## 2024-01-28 ENCOUNTER — HEALTH MAINTENANCE LETTER (OUTPATIENT)
Age: 54
End: 2024-01-28

## 2024-05-21 ENCOUNTER — OFFICE VISIT (OUTPATIENT)
Dept: URGENT CARE | Facility: URGENT CARE | Age: 54
End: 2024-05-21
Payer: COMMERCIAL

## 2024-05-21 VITALS
TEMPERATURE: 98.4 F | RESPIRATION RATE: 18 BRPM | BODY MASS INDEX: 20.96 KG/M2 | OXYGEN SATURATION: 99 % | WEIGHT: 125.8 LBS | SYSTOLIC BLOOD PRESSURE: 165 MMHG | DIASTOLIC BLOOD PRESSURE: 93 MMHG | HEIGHT: 65 IN | HEART RATE: 62 BPM

## 2024-05-21 DIAGNOSIS — M54.9 UPPER BACK PAIN ON RIGHT SIDE: Primary | ICD-10-CM

## 2024-05-21 DIAGNOSIS — M62.838 NECK MUSCLE SPASM: ICD-10-CM

## 2024-05-21 PROCEDURE — 99213 OFFICE O/P EST LOW 20 MIN: CPT | Performed by: FAMILY MEDICINE

## 2024-05-21 RX ORDER — METHOCARBAMOL 750 MG/1
750 TABLET, FILM COATED ORAL 4 TIMES DAILY
Qty: 28 TABLET | Refills: 0 | Status: SHIPPED | OUTPATIENT
Start: 2024-05-21 | End: 2024-05-28

## 2024-05-21 RX ORDER — NAPROXEN 500 MG/1
500 TABLET ORAL 2 TIMES DAILY WITH MEALS
Qty: 14 TABLET | Refills: 0 | Status: SHIPPED | OUTPATIENT
Start: 2024-05-21 | End: 2024-05-28

## 2024-05-21 ASSESSMENT — PAIN SCALES - GENERAL: PAINLEVEL: SEVERE PAIN (7)

## 2024-05-21 NOTE — PROGRESS NOTES
"SUBJECTIVE:  Chief Complaint   Patient presents with    Urgent Care     Patient presents to urgent care today stating \"Pulled muscle or something going on - I woke up I could barely move\", Right side neck down to below right shoulder blade.  \"Started about a week ago\" and has gotten more intense recently.  Taking Tylenol 500mg and little to no relief.   .ident who presents with a chief complaint of  right neck and upper bvack pain.  Symptoms began 1 week(s) ago , are moderate and intermittent episodes with certain positions and movements  Context:Injury: no      Past Medical History:   Diagnosis Date    Chronic kidney disease, stage III (moderate) (H)     Pure hypercholesterolemia      No Known Allergies  .socx    ROS:CONSTITUTIONAL:NEGATIVE for fever, chills, change in weight  RESP:NEGATIVE for significant cough or SOB and not painful with resiration  CV: NEGATIVE for chest pain, palpitations or peripheral edema    EXAM: BP (!) 165/93 (BP Location: Left arm)   Pulse 62   Temp 98.4  F (36.9  C) (Tympanic)   Resp 18   Ht 1.651 m (5' 5\")   Wt 57.1 kg (125 lb 12.8 oz)   SpO2 99%   BMI 20.93 kg/m     Exam:rt neck and rt rhomboids ain/spasm  GENERAL APPEARANCE: healthy, alert and no distress  EXTREMITIES: peripheral pulses normal  SKIN: no suspicious lesions or rashes  NEURO: Normal strength and tone, sensory exam grossly normal, mentation intact and speech normal    X-RAY was not done.    ASSESSMENT:     ICD-10-CM    1. Upper back pain on right side  M54.9 methocarbamol (ROBAXIN) 750 MG tablet     naproxen (NAPROSYN) 500 MG tablet      2. Neck muscle spasm  M62.838 methocarbamol (ROBAXIN) 750 MG tablet     naproxen (NAPROSYN) 500 MG tablet        heat    "

## 2025-02-02 ENCOUNTER — HEALTH MAINTENANCE LETTER (OUTPATIENT)
Age: 55
End: 2025-02-02

## 2025-06-21 ENCOUNTER — HEALTH MAINTENANCE LETTER (OUTPATIENT)
Age: 55
End: 2025-06-21